# Patient Record
Sex: MALE | Race: ASIAN
[De-identification: names, ages, dates, MRNs, and addresses within clinical notes are randomized per-mention and may not be internally consistent; named-entity substitution may affect disease eponyms.]

---

## 2019-02-25 ENCOUNTER — HOSPITAL ENCOUNTER (EMERGENCY)
Dept: HOSPITAL 4 - SED | Age: 29
Discharge: HOME | End: 2019-02-25
Payer: COMMERCIAL

## 2019-02-25 VITALS — WEIGHT: 220 LBS | BODY MASS INDEX: 34.53 KG/M2 | HEIGHT: 67 IN

## 2019-02-25 VITALS — SYSTOLIC BLOOD PRESSURE: 150 MMHG

## 2019-02-25 DIAGNOSIS — R03.0: ICD-10-CM

## 2019-02-25 DIAGNOSIS — S61.032A: Primary | ICD-10-CM
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
## 2019-02-26 LAB — HCV AB S/CO SERPL IA: <0.1 S/CO RATIO (ref 0–0.9)

## 2019-04-04 ENCOUNTER — HOSPITAL ENCOUNTER (OUTPATIENT)
Dept: HOSPITAL 4 - SLB | Age: 29
Discharge: HOME | End: 2019-04-04
Attending: INTERNAL MEDICINE
Payer: COMMERCIAL
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
## 2019-04-04 DIAGNOSIS — Z77.21: Primary | ICD-10-CM
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
## 2019-04-04 DIAGNOSIS — S60.949A: ICD-10-CM

## 2019-04-05 LAB — HCV AB S/CO SERPL IA: <0.1 S/CO RATIO (ref 0–0.9)

## 2019-07-09 ENCOUNTER — HOSPITAL ENCOUNTER (OUTPATIENT)
Dept: HOSPITAL 4 - SLB | Age: 29
Discharge: HOME | End: 2019-07-09
Attending: INTERNAL MEDICINE
Payer: COMMERCIAL

## 2019-07-09 DIAGNOSIS — T14.8XXA: ICD-10-CM
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
## 2019-07-09 DIAGNOSIS — Z20.5: ICD-10-CM
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
## 2019-07-09 DIAGNOSIS — Z77.21: Primary | ICD-10-CM
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
## 2019-07-10 LAB — HCV AB S/CO SERPL IA: <0.1 S/CO RATIO (ref 0–0.9)

## 2019-10-13 ENCOUNTER — HOSPITAL ENCOUNTER (OUTPATIENT)
Dept: HOSPITAL 4 - SLB | Age: 29
Discharge: STILL ADMITTED OR ADMINISTRATIVE | End: 2019-10-13
Attending: INTERNAL MEDICINE
Payer: COMMERCIAL

## 2019-10-13 DIAGNOSIS — Z00.01: Primary | ICD-10-CM

## 2019-10-13 LAB
ALBUMIN SERPL BCP-MCNC: 4.5 G/DL (ref 3.4–4.8)
ALT SERPL W P-5'-P-CCNC: 34 U/L (ref 12–78)
ANION GAP SERPL CALCULATED.3IONS-SCNC: 8 MMOL/L (ref 5–15)
AST SERPL W P-5'-P-CCNC: 22 U/L (ref 10–37)
BASOPHILS # BLD AUTO: 0 K/UL (ref 0–0.2)
BASOPHILS NFR BLD AUTO: 0.4 % (ref 0–2)
BILIRUB SERPL-MCNC: 1.1 MG/DL (ref 0–1)
BUN SERPL-MCNC: 17 MG/DL (ref 8–21)
CALCIUM SERPL-MCNC: 9.7 MG/DL (ref 8.4–11)
CHLORIDE SERPL-SCNC: 104 MMOL/L (ref 98–107)
CHOLEST SERPL-MCNC: 192 MG/DL (ref ?–200)
CREAT SERPL-MCNC: 1.05 MG/DL (ref 0.55–1.3)
EOSINOPHIL # BLD AUTO: 0.1 K/UL (ref 0–0.4)
EOSINOPHIL NFR BLD AUTO: 1.2 % (ref 0–4)
ERYTHROCYTE [DISTWIDTH] IN BLOOD BY AUTOMATED COUNT: 12.6 % (ref 9–15)
GFR SERPL CREATININE-BSD FRML MDRD: 108 ML/MIN (ref 90–?)
GLUCOSE SERPL-MCNC: 98 MG/DL (ref 70–99)
HCT VFR BLD AUTO: 45.3 % (ref 36–54)
HDLC SERPL-MCNC: 61 MG/DL (ref 45–?)
HGB BLD-MCNC: 15.4 G/DL (ref 14–18)
LDL CHOLESTEROL: 114 MG/DL (ref ?–100)
LYMPHOCYTES # BLD AUTO: 2.1 K/UL (ref 1–5.5)
LYMPHOCYTES NFR BLD AUTO: 24 % (ref 20.5–51.5)
MCH RBC QN AUTO: 31 PG (ref 27–31)
MCHC RBC AUTO-ENTMCNC: 34 % (ref 32–36)
MCV RBC AUTO: 90 FL (ref 79–98)
MONOCYTES # BLD MANUAL: 0.6 K/UL (ref 0–1)
MONOCYTES # BLD MANUAL: 6.2 % (ref 1.7–9.3)
NEUTROPHILS # BLD AUTO: 6 K/UL (ref 1.8–7.7)
NEUTROPHILS NFR BLD AUTO: 68.2 % (ref 40–70)
PLATELET # BLD AUTO: 237 K/UL (ref 130–430)
POTASSIUM SERPL-SCNC: 4 MMOL/L (ref 3.5–5.1)
RBC # BLD AUTO: 5.02 MIL/UL (ref 4.2–6.2)
SODIUM SERPLBLD-SCNC: 139 MMOL/L (ref 136–145)
TRIGL SERPL-MCNC: 81 MG/DL (ref 30–150)
TSH SERPL DL<=0.05 MIU/L-ACNC: 1.15 UIU/ML (ref 0.36–3.74)
URATE UR-MCNC: 8 MG/DL (ref 2.4–7)
WBC # BLD AUTO: 8.8 K/UL (ref 4.8–10.8)

## 2019-10-14 LAB — HBA1C MFR BLD: 5.5 % (ref 4.8–5.6)

## 2019-10-15 LAB — VIT B12 SERPL-MCNC: 969 PG/ML (ref 232–1245)

## 2020-01-09 ENCOUNTER — HOSPITAL ENCOUNTER (OUTPATIENT)
Dept: HOSPITAL 4 - SLB | Age: 30
Discharge: HOME | End: 2020-01-09
Attending: INTERNAL MEDICINE
Payer: COMMERCIAL

## 2020-01-09 DIAGNOSIS — Z77.21: Primary | ICD-10-CM

## 2020-01-10 LAB — HCV AB S/CO SERPL IA: <0.1 S/CO RATIO (ref 0–0.9)

## 2020-03-10 ENCOUNTER — HOSPITAL ENCOUNTER (EMERGENCY)
Dept: HOSPITAL 4 - SED | Age: 30
Discharge: HOME | End: 2020-03-10
Payer: COMMERCIAL

## 2020-03-10 VITALS — SYSTOLIC BLOOD PRESSURE: 151 MMHG

## 2020-03-10 VITALS — WEIGHT: 260 LBS | HEIGHT: 66 IN | BODY MASS INDEX: 41.78 KG/M2

## 2020-03-10 DIAGNOSIS — R06.02: ICD-10-CM

## 2020-03-10 DIAGNOSIS — R07.89: Primary | ICD-10-CM

## 2020-03-10 LAB
ALBUMIN SERPL BCP-MCNC: 3.9 G/DL (ref 3.4–4.8)
ALT SERPL W P-5'-P-CCNC: 30 U/L (ref 12–78)
ANION GAP SERPL CALCULATED.3IONS-SCNC: 8 MMOL/L (ref 5–15)
AST SERPL W P-5'-P-CCNC: 20 U/L (ref 10–37)
BASOPHILS # BLD AUTO: 0 K/UL (ref 0–0.2)
BASOPHILS NFR BLD AUTO: 0.6 % (ref 0–2)
BILIRUB SERPL-MCNC: 0.8 MG/DL (ref 0–1)
BUN SERPL-MCNC: 12 MG/DL (ref 8–21)
CALCIUM SERPL-MCNC: 9.2 MG/DL (ref 8.4–11)
CHLORIDE SERPL-SCNC: 101 MMOL/L (ref 98–107)
CREAT SERPL-MCNC: 1 MG/DL (ref 0.55–1.3)
EOSINOPHIL # BLD AUTO: 0.2 K/UL (ref 0–0.4)
EOSINOPHIL NFR BLD AUTO: 3.2 % (ref 0–4)
ERYTHROCYTE [DISTWIDTH] IN BLOOD BY AUTOMATED COUNT: 14 % (ref 9–15)
GFR SERPL CREATININE-BSD FRML MDRD: 114 ML/MIN (ref 90–?)
GLUCOSE SERPL-MCNC: 93 MG/DL (ref 70–99)
HCT VFR BLD AUTO: 46.3 % (ref 36–54)
HGB BLD-MCNC: 15.7 G/DL (ref 14–18)
LYMPHOCYTES # BLD AUTO: 2.6 K/UL (ref 1–5.5)
LYMPHOCYTES NFR BLD AUTO: 36.7 % (ref 20.5–51.5)
MCH RBC QN AUTO: 30 PG (ref 27–31)
MCHC RBC AUTO-ENTMCNC: 34 % (ref 32–36)
MCV RBC AUTO: 87 FL (ref 79–98)
MONOCYTES # BLD MANUAL: 0.6 K/UL (ref 0–1)
MONOCYTES # BLD MANUAL: 8.3 % (ref 1.7–9.3)
NEUTROPHILS # BLD AUTO: 3.6 K/UL (ref 1.8–7.7)
NEUTROPHILS NFR BLD AUTO: 51.2 % (ref 40–70)
PLATELET # BLD AUTO: 245 K/UL (ref 130–430)
POTASSIUM SERPL-SCNC: 4.1 MMOL/L (ref 3.5–5.1)
RBC # BLD AUTO: 5.32 MIL/UL (ref 4.2–6.2)
SODIUM SERPLBLD-SCNC: 136 MMOL/L (ref 136–145)
WBC # BLD AUTO: 7.1 K/UL (ref 4.8–10.8)

## 2020-03-10 NOTE — NUR
Placed in room 08. Placed on cardiac monitor, blood pressure machine and pulse 
oximeter. To gown for exam. Side rails up.

## 2020-03-10 NOTE — NUR
Patient given written and verbal discharge instructions and verbalizes 
understanding.  ER MD discussed with patient the results and treatment 
provided. Patient in stable condition. ID arm band removed.

Rx of NAPROXEN given. Patient educated on pain management and to follow up with 
PMD. Pain Scale 0.

Opportunity for questions provided and answered. Medication side effect fact 
sheet provided.

## 2023-09-01 NOTE — NUR
ER Dr. Hancock at bedside examining patient.
Lab at bedside obtaining blood work. pt tolerated well.
Patient given written and verbal discharge instructions and verbalizes 
understanding.  ER MD discussed with patient the results and treatment 
provided. Patient in stable condition. ID arm band removed.

No Rx  given. Patient educated on pain management and to follow up with PMD. 
Pain Scale 0.

Opportunity for questions provided and answered. Medication side effect fact 
sheet provided.
Patient to ER H1 to gown for evaluation. Side rails up.
Pt came in today for fingerstick at work. Pt administered Lovenox 
subcutaneously and accidentally to left thumb. Per patient, he squeezed thumb 
right away to drain any solution that may have been in. Pt denies any pain, 
N/V, or fever. No active bleeding noted. No other injuries/complaints per 
patient or noted.
FAMILY HISTORY:  No pertinent family history in first degree relatives